# Patient Record
Sex: MALE | Race: WHITE | NOT HISPANIC OR LATINO | Employment: UNEMPLOYED | URBAN - NONMETROPOLITAN AREA
[De-identification: names, ages, dates, MRNs, and addresses within clinical notes are randomized per-mention and may not be internally consistent; named-entity substitution may affect disease eponyms.]

---

## 2017-03-25 ENCOUNTER — OFFICE VISIT (OUTPATIENT)
Dept: RETAIL CLINIC | Facility: CLINIC | Age: 27
End: 2017-03-25

## 2017-03-25 VITALS
HEIGHT: 70 IN | RESPIRATION RATE: 18 BRPM | WEIGHT: 213 LBS | DIASTOLIC BLOOD PRESSURE: 78 MMHG | TEMPERATURE: 97.5 F | HEART RATE: 94 BPM | BODY MASS INDEX: 30.49 KG/M2 | SYSTOLIC BLOOD PRESSURE: 122 MMHG | OXYGEN SATURATION: 98 %

## 2017-03-25 DIAGNOSIS — J02.9 PHARYNGITIS, UNSPECIFIED ETIOLOGY: Primary | ICD-10-CM

## 2017-03-25 PROCEDURE — 99213 OFFICE O/P EST LOW 20 MIN: CPT | Performed by: NURSE PRACTITIONER

## 2017-03-25 RX ORDER — AZITHROMYCIN 250 MG/1
TABLET, FILM COATED ORAL
Qty: 6 TABLET | Refills: 0 | Status: SHIPPED | OUTPATIENT
Start: 2017-03-25

## 2017-03-25 RX ORDER — LORAZEPAM 2 MG/1
TABLET ORAL 2 TIMES DAILY
COMMUNITY
Start: 2016-12-01

## 2017-03-25 NOTE — PATIENT INSTRUCTIONS

## 2017-03-25 NOTE — PROGRESS NOTES
"  Chief Complaint   Patient presents with   • Sore Throat     Subjective   Yoni Pulido is a 26 y.o. male who presents to the clinic today with complaints soret throat, mild occasional cough, fatigue and some sinus drainage. He is here today with his wife who has been febrile and sick for over a week. He has not taken anything for his symptoms.        The following portions of the patient's history were reviewed and updated as appropriate: allergies, past family history, past medical history, past social history, past surgical history and problem list.      Current Outpatient Prescriptions:   •  LORazepam (ATIVAN) 2 MG tablet, Take  by mouth 2 (Two) Times a Day., Disp: , Rfl:   •  azithromycin (ZITHROMAX Z-ADE) 250 MG tablet, Take 2 tablets the first day, then 1 tablet daily for 4 days., Disp: 6 tablet, Rfl: 0  Allergies:  Amoxicillin-pot clavulanate  Past Medical History:   Diagnosis Date   • ADHD (attention deficit hyperactivity disorder)    • Anxiety    • Bipolar disorder      History reviewed. No pertinent surgical history.  History reviewed. No pertinent family history.  Social History   Substance Use Topics   • Smoking status: Never Smoker   • Smokeless tobacco: Never Used   • Alcohol use No       Review of Systems  Review of Systems   Constitutional: Positive for fatigue.   HENT: Positive for postnasal drip and sore throat.    Eyes: Negative.    Respiratory: Positive for cough (mild ).    Cardiovascular: Negative.    Gastrointestinal: Negative.    Endocrine: Negative.    Genitourinary: Negative.    Musculoskeletal: Negative.    Skin: Negative.    Allergic/Immunologic: Negative.    Neurological: Negative.    Hematological: Negative.    Psychiatric/Behavioral: Negative.        Objective   /78 (BP Location: Left arm, Patient Position: Sitting, Cuff Size: Large Adult)  Pulse 94  Temp 97.5 °F (36.4 °C) (Temporal Artery )   Resp 18  Ht 70\" (177.8 cm)  Wt 213 lb (96.6 kg)  SpO2 98%  BMI 30.56 " kg/m2  Last 2 weights    03/25/17  1024   Weight: 213 lb (96.6 kg)       Physical Exam   Constitutional: He is oriented to person, place, and time. He appears well-developed and well-nourished.   HENT:   Head: Normocephalic and atraumatic.   Right Ear: Hearing, tympanic membrane, external ear and ear canal normal.   Left Ear: Hearing, tympanic membrane, external ear and ear canal normal.   Nose: Rhinorrhea present. Right sinus exhibits no maxillary sinus tenderness and no frontal sinus tenderness. Left sinus exhibits no maxillary sinus tenderness and no frontal sinus tenderness.   Mouth/Throat: Uvula is midline and mucous membranes are normal. Posterior oropharyngeal erythema present. Tonsils are 2+ on the right. Tonsils are 2+ on the left. No tonsillar exudate.   Eyes: Pupils are equal, round, and reactive to light.   Neck: Normal range of motion. Neck supple.   Cardiovascular: Normal rate, regular rhythm and normal heart sounds.  Exam reveals no gallop and no friction rub.    No murmur heard.  Pulmonary/Chest: No stridor.   Musculoskeletal: Normal range of motion.   Lymphadenopathy:     He has no cervical adenopathy.   Neurological: He is alert and oriented to person, place, and time.   Psychiatric: He has a normal mood and affect. His behavior is normal.   Vitals reviewed.      Assessment/Plan     Yoni was seen today for sore throat.    Diagnoses and all orders for this visit:    Pharyngitis, unspecified etiology    Other orders  -     azithromycin (ZITHROMAX Z-ADE) 250 MG tablet; Take 2 tablets the first day, then 1 tablet daily for 4 days.    Follow up if symptoms worsen or persist.

## 2017-11-01 ENCOUNTER — OFFICE VISIT (OUTPATIENT)
Dept: PSYCHIATRY | Age: 27
End: 2017-11-01
Payer: MEDICARE

## 2017-11-01 ENCOUNTER — TELEPHONE (OUTPATIENT)
Dept: PSYCHIATRY | Age: 27
End: 2017-11-01

## 2017-11-01 VITALS
WEIGHT: 222.2 LBS | DIASTOLIC BLOOD PRESSURE: 77 MMHG | HEART RATE: 69 BPM | HEIGHT: 70 IN | OXYGEN SATURATION: 97 % | SYSTOLIC BLOOD PRESSURE: 116 MMHG | BODY MASS INDEX: 31.81 KG/M2

## 2017-11-01 DIAGNOSIS — F40.01 PANIC DISORDER WITH AGORAPHOBIA AND SEVERE PANIC ATTACKS: ICD-10-CM

## 2017-11-01 PROCEDURE — 99999 PR OFFICE/OUTPT VISIT,PROCEDURE ONLY: CPT | Performed by: NURSE PRACTITIONER

## 2017-11-01 PROCEDURE — 90832 PSYTX W PT 30 MINUTES: CPT | Performed by: NURSE PRACTITIONER

## 2017-11-01 RX ORDER — CYPROHEPTADINE HYDROCHLORIDE 4 MG/1
TABLET ORAL
Qty: 30 TABLET | Refills: 1 | Status: SHIPPED | OUTPATIENT
Start: 2017-11-01 | End: 2019-01-09

## 2017-11-01 RX ORDER — TRAZODONE HYDROCHLORIDE 50 MG/1
TABLET ORAL
Qty: 60 TABLET | Refills: 1 | Status: SHIPPED | OUTPATIENT
Start: 2017-11-01 | End: 2019-01-09

## 2017-11-01 RX ORDER — LORAZEPAM 0.5 MG/1
TABLET ORAL
Qty: 30 TABLET | Refills: 0 | Status: SHIPPED | OUTPATIENT
Start: 2017-11-01 | End: 2019-01-09

## 2017-11-01 NOTE — PROGRESS NOTES
11/1/2017 10:52 AM   Progress Note        Morris Mccall 1990  Psychotherapy Time Spent: 28 min      Psychotherapy Topics: medications, medication adherence, therapy    Chief Complaint   Patient presents with    Follow-up     Subjective: The patient is a 28 y.o.   male who is here for a routine office visit. Last seen by  LLUVIA Felix 12/1/16. Dx: Mood D/O with psychosis, Panic D/O with agoraphobia and severe panic attacks. With mom today. She reports pt is not able to come to appts without someone with him (which would be his mom, dad or wife) because of anxiety.  and is going well. Lives with her alone and very near his parents. No kids. Depression: \"I've been doing better. \" Mom agrees. Less irritability per pt. Anxiety: Only takes Ativan when needed (when goes out and sometimes for sleep). He has not had his Ativan refilled for a few months. \"Social situations give me anxiety. Leaving the house and even thinking about leaving the house. Like yesterday I was nervous about coming here today. \" Mom states this started in high school but she didn't realize what was going on. He dropped out because of this and was home schooled. Tried to go to college and dropped out twice. Pt reports he has had decreased panic attacks since last appt. Using coping skills he has learned over time. He went to counseling four to five years ago at [facility] and learned some there. Coping better per mom. Pt is not interested in getting out by himself. \"Maybe I'm just used to not doing it. \" We discussed possible connection between benzos and Alzheimer's. He is interested in dcing this med in the future. Mom reports his grandmother had a lot of problems with anxiety and also had difficulty getting out. Dad has hx of depression, grandmother too. Sleep: Takes Ativan if needed for sleep if has a bad day (too anxious). Takes Ativan sparingly. Every night his mind races.  His mind jumps from one thing to another during the day too. \"I can't think about nothing. \" His mom states his mind has difficulty shutting down. Has dreams and night parrish both- night parrish every other night \"for as long as I can remember. I go to bed and wonder what I will dream about that night. \" Interrupts his sleep. Took Trazodone in the past but stopped. He wanted to be able to fall asleep naturally but we discussed this has not been the case for him. He, of course, can continue his current pattern or possibly consider a medication. Appetite: Fine  Substance Use: Denies  Pain: Denies  AH- mumbling- happens every couple of weeks, usually before a panic attack and is happening much less than before. VH- one incident of someone walking down the road. He feels it was related to a build up to a panic attack. Pt does not want to socialize and per mom he has even as a child. Pt goal for this appt. \"I really need that Ativan to sleep and get out of the house. \"  Pt was prescribed Risperdal 0.25 mg bid at last appt and he did take it \"for a while\" but did not notice a difference. He is not sure how long he took it. We discussed trials for meds and adherence to give meds the chance for full benefit. He agrees. He would like to try Trazodone and Periactin. We also discussed starting meds slowly (because of his reticence about meds and past nonadherence). He is ok with this. He understands he can take Trazodone prn but has to take Periactin regularly. He will call with questions or problems. Patient reports side effects as follows: none. No evidence of EPS, no cogwheeling or abnormal motor movements. Absent  suicidal ideation. Reports compliance with medications as see above. Review of Systems - Denies change    Current Meds:    Prior to Admission medications    Medication Sig Start Date End Date Taking?  Authorizing Provider   LORazepam (ATIVAN) 2 MG tablet Take 1 tablet by mouth 2 times daily as needed for Anxiety (Take only as needed for severe anxiety) 12/1/16 12/31/16  Terrie Juan Diego, NP   risperiDONE (RISPERDAL) 0.25 MG tablet Take 1 tablet by mouth 2 times daily 12/1/16   Terrie Juan Diego, NP       MSE:  Patient is  A & O x3. Appearance:  well-appearing appropriately dressed for season and age. Cognition:  Recent memory intact , remote memory intact , good fund of knowledge, average  intelligence level. Speech:  normal  Language: Naming: intact; Word Finding: intact  Conversation no evidence of delusions  Behavior:  Cooperative and Good eye contact (eye contact improved over time from none to almost constant)  Mood: euthymic  Affect: congruent with mood  Thought Content: no evidence of overt psychosis, delusional thought or suicidal /homicidal ideation or plan  Thought Process: linear, goal directed and coherent  Judgement Insight:  normal and appropriate  Gait and Station:normal gait and station   Musculoskeletal: WNL    Assesment: See above   Hansel Jean report reviewed per  req. Plan: Start Trazodone  mg take one to two tablets qhs prn insomnia, Periactin 4 mg take one tablet qhs    1. The risks, benefits, side effects, indications, contraindications, and adverse effects of the medications have been discussed. yes   2. The pt has verbalized understanding and has capacity to give informed consent. 3. The Hansel Jean report has been reviewed according to [facility] regulations. 4. Supportive therapy offered. 5. Follow up: Return in one month  6. The patient has been advised to call with any problems. 7. Controlled substance Treatment Plan: Maintenance for now.    8. The above listed medications have been continued, modifications in meds and other orders/labs as follows:

## 2019-01-09 ENCOUNTER — OFFICE VISIT (OUTPATIENT)
Dept: PSYCHIATRY | Age: 29
End: 2019-01-09
Payer: MEDICARE

## 2019-01-09 VITALS
OXYGEN SATURATION: 98 % | HEART RATE: 93 BPM | SYSTOLIC BLOOD PRESSURE: 131 MMHG | DIASTOLIC BLOOD PRESSURE: 73 MMHG | BODY MASS INDEX: 21.48 KG/M2 | WEIGHT: 145 LBS | HEIGHT: 69 IN

## 2019-01-09 DIAGNOSIS — E43 SEVERE PROTEIN-CALORIE MALNUTRITION (HCC): ICD-10-CM

## 2019-01-09 DIAGNOSIS — Z91.14 NONCOMPLIANCE WITH MEDICATIONS: ICD-10-CM

## 2019-01-09 DIAGNOSIS — R79.89 LOW VITAMIN D LEVEL: ICD-10-CM

## 2019-01-09 DIAGNOSIS — Z79.899 HIGH RISK MEDICATION USE: ICD-10-CM

## 2019-01-09 DIAGNOSIS — R63.4 WEIGHT LOSS: ICD-10-CM

## 2019-01-09 DIAGNOSIS — F40.01 AGORAPHOBIA WITH PANIC ATTACKS: Primary | ICD-10-CM

## 2019-01-09 PROBLEM — Z91.148 NONCOMPLIANCE WITH MEDICATIONS: Status: ACTIVE | Noted: 2019-01-09

## 2019-01-09 PROCEDURE — 99215 OFFICE O/P EST HI 40 MIN: CPT | Performed by: NURSE PRACTITIONER

## 2019-01-22 DIAGNOSIS — R79.89 LOW VITAMIN D LEVEL: ICD-10-CM

## 2019-01-22 DIAGNOSIS — E43 SEVERE PROTEIN-CALORIE MALNUTRITION (HCC): ICD-10-CM

## 2019-01-22 DIAGNOSIS — Z79.899 HIGH RISK MEDICATION USE: ICD-10-CM

## 2019-01-22 DIAGNOSIS — R63.4 WEIGHT LOSS: ICD-10-CM

## 2019-01-22 DIAGNOSIS — F40.01 AGORAPHOBIA WITH PANIC ATTACKS: ICD-10-CM

## 2019-01-22 LAB
ALBUMIN SERPL-MCNC: 4.7 G/DL (ref 3.5–5.2)
ALP BLD-CCNC: 99 U/L (ref 40–130)
ALT SERPL-CCNC: 34 U/L (ref 5–41)
ANION GAP SERPL CALCULATED.3IONS-SCNC: 13 MMOL/L (ref 7–19)
AST SERPL-CCNC: 18 U/L (ref 5–40)
BILIRUB SERPL-MCNC: 0.7 MG/DL (ref 0.2–1.2)
BUN BLDV-MCNC: 15 MG/DL (ref 6–20)
CALCIUM SERPL-MCNC: 10.2 MG/DL (ref 8.6–10)
CHLORIDE BLD-SCNC: 104 MMOL/L (ref 98–111)
CO2: 28 MMOL/L (ref 22–29)
CREAT SERPL-MCNC: 0.6 MG/DL (ref 0.5–1.2)
GFR NON-AFRICAN AMERICAN: >60
GLUCOSE FASTING: 83 MG/DL (ref 74–109)
HBA1C MFR BLD: 5.1 % (ref 4–6)
HCT VFR BLD CALC: 46.5 % (ref 42–52)
HEMOGLOBIN: 14.7 G/DL (ref 14–18)
MCH RBC QN AUTO: 29.8 PG (ref 27–31)
MCHC RBC AUTO-ENTMCNC: 31.6 G/DL (ref 33–37)
MCV RBC AUTO: 94.3 FL (ref 80–94)
PDW BLD-RTO: 13 % (ref 11.5–14.5)
PLATELET # BLD: 293 K/UL (ref 130–400)
PMV BLD AUTO: 10.1 FL (ref 9.4–12.4)
POTASSIUM SERPL-SCNC: 4.3 MMOL/L (ref 3.5–5)
RBC # BLD: 4.93 M/UL (ref 4.7–6.1)
SODIUM BLD-SCNC: 145 MMOL/L (ref 136–145)
TOTAL PROTEIN: 7.6 G/DL (ref 6.6–8.7)
VITAMIN B-12: 853 PG/ML (ref 211–946)
WBC # BLD: 5.4 K/UL (ref 4.8–10.8)

## 2019-01-26 LAB
VITAMIN D2 AND D3, TOTAL: 28.3 NG/ML (ref 30–80)
VITAMIN D2, 25 HYDROXY: <1 NG/ML
VITAMIN D3,25 HYDROXY: 28.3 NG/ML

## 2019-08-12 ENCOUNTER — APPOINTMENT (OUTPATIENT)
Dept: LAB | Facility: HOSPITAL | Age: 29
End: 2019-08-12

## 2019-08-12 ENCOUNTER — TRANSCRIBE ORDERS (OUTPATIENT)
Dept: ADMINISTRATIVE | Facility: HOSPITAL | Age: 29
End: 2019-08-12

## 2019-08-12 DIAGNOSIS — N46.9 INFERTILITY MALE: Primary | ICD-10-CM

## 2019-08-12 LAB
CHARACTER SMN: NORMAL
COLLECTION METHOD SMN: ABNORMAL
EPI CELLS #/AREA SMN HPF: ABNORMAL /HPF
LIQUEFACTION TIME SMN: NORMAL MIN
NON PROGRESSIVE MOTILITY: 10.1 %
RBC #/AREA SMN HPF: ABNORMAL /HPF
SEX ABSTIN DURATION TIME PATIENT: 7 DAYS
SLOW PROGRESSIVE MOTILITY: 16.4 %
SMI: 102 (ref 80–400)
SPEC CONTAINER SPEC: ABNORMAL
SPECIMEN VOL SMN: 4 ML
SPERM # SMN: 44.6 MILLIONS/ML
SPERM IMMOTILE NFR SMN: 53.8 %
SPERM MOTILE NFR SMN: 46.2 % MOTILE (ref 50–100)
SPERM PROG NFR SMN: 19.7 % (ref 25–100)
SPERM SMN: 7.4 % NORMAL (ref 15–100)
VISC SMN: NORMAL CP
WBC SMN QL: ABNORMAL /HPF
WHO STANDARD: ABNORMAL

## 2019-08-12 PROCEDURE — 89320 SEMEN ANAL VOL/COUNT/MOT: CPT | Performed by: OBSTETRICS & GYNECOLOGY

## 2020-07-15 ENCOUNTER — OFFICE VISIT (OUTPATIENT)
Dept: INTERNAL MEDICINE | Age: 30
End: 2020-07-15
Payer: MEDICARE

## 2020-07-15 VITALS
SYSTOLIC BLOOD PRESSURE: 122 MMHG | BODY MASS INDEX: 20.32 KG/M2 | DIASTOLIC BLOOD PRESSURE: 70 MMHG | OXYGEN SATURATION: 99 % | HEIGHT: 69 IN | HEART RATE: 66 BPM | WEIGHT: 137.2 LBS

## 2020-07-15 DIAGNOSIS — E55.9 HYPOVITAMINOSIS D: ICD-10-CM

## 2020-07-15 DIAGNOSIS — Z11.4 SCREENING FOR HIV WITHOUT PRESENCE OF RISK FACTORS: ICD-10-CM

## 2020-07-15 DIAGNOSIS — Z00.00 ROUTINE PHYSICAL EXAMINATION: ICD-10-CM

## 2020-07-15 PROBLEM — Z91.14 NONCOMPLIANCE WITH MEDICATIONS: Status: RESOLVED | Noted: 2019-01-09 | Resolved: 2020-07-15

## 2020-07-15 PROBLEM — Z91.148 NONCOMPLIANCE WITH MEDICATIONS: Status: RESOLVED | Noted: 2019-01-09 | Resolved: 2020-07-15

## 2020-07-15 LAB
ALBUMIN SERPL-MCNC: 5.2 G/DL (ref 3.5–5.2)
ALP BLD-CCNC: 93 U/L (ref 40–130)
ALT SERPL-CCNC: 30 U/L (ref 5–41)
ANION GAP SERPL CALCULATED.3IONS-SCNC: 14 MMOL/L (ref 7–19)
AST SERPL-CCNC: 19 U/L (ref 5–40)
BASOPHILS ABSOLUTE: 0 K/UL (ref 0–0.2)
BASOPHILS RELATIVE PERCENT: 0.7 % (ref 0–1)
BILIRUB SERPL-MCNC: 1.1 MG/DL (ref 0.2–1.2)
BUN BLDV-MCNC: 13 MG/DL (ref 6–20)
CALCIUM SERPL-MCNC: 10.4 MG/DL (ref 8.6–10)
CHLORIDE BLD-SCNC: 103 MMOL/L (ref 98–111)
CHOLESTEROL, TOTAL: 112 MG/DL (ref 160–199)
CO2: 27 MMOL/L (ref 22–29)
CREAT SERPL-MCNC: 0.7 MG/DL (ref 0.5–1.2)
EOSINOPHILS ABSOLUTE: 0.1 K/UL (ref 0–0.6)
EOSINOPHILS RELATIVE PERCENT: 2.4 % (ref 0–5)
GFR AFRICAN AMERICAN: >59
GFR NON-AFRICAN AMERICAN: >60
GLUCOSE BLD-MCNC: 90 MG/DL (ref 74–109)
HCT VFR BLD CALC: 46.6 % (ref 42–52)
HDLC SERPL-MCNC: 59 MG/DL (ref 55–121)
HEMOGLOBIN: 15.2 G/DL (ref 14–18)
HIV-1 P24 AG: NORMAL
IMMATURE GRANULOCYTES #: 0 K/UL
LDL CHOLESTEROL CALCULATED: 45 MG/DL
LYMPHOCYTES ABSOLUTE: 1.6 K/UL (ref 1.1–4.5)
LYMPHOCYTES RELATIVE PERCENT: 39.5 % (ref 20–40)
MCH RBC QN AUTO: 30.8 PG (ref 27–31)
MCHC RBC AUTO-ENTMCNC: 32.6 G/DL (ref 33–37)
MCV RBC AUTO: 94.3 FL (ref 80–94)
MONOCYTES ABSOLUTE: 0.3 K/UL (ref 0–0.9)
MONOCYTES RELATIVE PERCENT: 7.6 % (ref 0–10)
NEUTROPHILS ABSOLUTE: 2 K/UL (ref 1.5–7.5)
NEUTROPHILS RELATIVE PERCENT: 49.8 % (ref 50–65)
PDW BLD-RTO: 12.7 % (ref 11.5–14.5)
PLATELET # BLD: 251 K/UL (ref 130–400)
PMV BLD AUTO: 9.9 FL (ref 9.4–12.4)
POTASSIUM SERPL-SCNC: 4.4 MMOL/L (ref 3.5–5)
RAPID HIV 1&2: NORMAL
RBC # BLD: 4.94 M/UL (ref 4.7–6.1)
SODIUM BLD-SCNC: 144 MMOL/L (ref 136–145)
TOTAL PROTEIN: 7.7 G/DL (ref 6.6–8.7)
TRIGL SERPL-MCNC: 38 MG/DL (ref 0–149)
TSH SERPL DL<=0.05 MIU/L-ACNC: 1.34 UIU/ML (ref 0.27–4.2)
VITAMIN D 25-HYDROXY: 35 NG/ML
WBC # BLD: 4.1 K/UL (ref 4.8–10.8)

## 2020-07-15 PROCEDURE — 1036F TOBACCO NON-USER: CPT | Performed by: INTERNAL MEDICINE

## 2020-07-15 PROCEDURE — G8427 DOCREV CUR MEDS BY ELIG CLIN: HCPCS | Performed by: INTERNAL MEDICINE

## 2020-07-15 PROCEDURE — 99203 OFFICE O/P NEW LOW 30 MIN: CPT | Performed by: INTERNAL MEDICINE

## 2020-07-15 PROCEDURE — 86580 TB INTRADERMAL TEST: CPT | Performed by: INTERNAL MEDICINE

## 2020-07-15 PROCEDURE — G8420 CALC BMI NORM PARAMETERS: HCPCS | Performed by: INTERNAL MEDICINE

## 2020-07-15 ASSESSMENT — ENCOUNTER SYMPTOMS
TROUBLE SWALLOWING: 0
BLOOD IN STOOL: 0
STRIDOR: 0
BACK PAIN: 0
COLOR CHANGE: 0
ABDOMINAL PAIN: 0
WHEEZING: 0
CONSTIPATION: 0
COUGH: 0
SHORTNESS OF BREATH: 0
SORE THROAT: 0
DIARRHEA: 0

## 2020-07-15 ASSESSMENT — PATIENT HEALTH QUESTIONNAIRE - PHQ9
SUM OF ALL RESPONSES TO PHQ9 QUESTIONS 1 & 2: 0
2. FEELING DOWN, DEPRESSED OR HOPELESS: 0
SUM OF ALL RESPONSES TO PHQ QUESTIONS 1-9: 0
SUM OF ALL RESPONSES TO PHQ QUESTIONS 1-9: 0
1. LITTLE INTEREST OR PLEASURE IN DOING THINGS: 0

## 2020-07-15 NOTE — PATIENT INSTRUCTIONS
Patient Education        Learning About the Mediterranean Diet  What is the [address]? The Mediterranean diet is a style of eating rather than a diet plan. It features foods eaten in [place], [place], [place] and [place], and other countries along the [facility]. It emphasizes eating foods like fish, fruits, vegetables, beans, high-fiber breads and whole grains, nuts, and olive oil. This style of eating includes limited red meat, cheese, and sweets. Why choose the Mediterranean diet? A Mediterranean-style diet may improve heart health. It contains more fat than other heart-healthy diets. But the fats are mainly from nuts, unsaturated oils (such as fish oils and olive oil), and certain nut or seed oils (such as canola, soybean, or flaxseed oil). These fats may help protect the heart and blood vessels. How can you get started on the Mediterranean diet? Here are some things you can do to switch to a more Mediterranean way of eating. What to eat  · Eat a variety of fruits and vegetables each day, such as grapes, blueberries, tomatoes, broccoli, peppers, figs, olives, spinach, eggplant, beans, lentils, and chickpeas. · Eat a variety of whole-grain foods each day, such as oats, brown rice, and whole wheat bread, pasta, and couscous. · Eat fish at least 2 times a week. Try tuna, salmon, mackerel, lake trout, herring, or sardines. · Eat moderate amounts of low-fat dairy products, such as milk, cheese, or yogurt. · Eat moderate amounts of poultry and eggs. · Choose healthy (unsaturated) fats, such as nuts, olive oil, and certain nut or seed oils like canola, soybean, and flaxseed. · Limit unhealthy (saturated) fats, such as butter, palm oil, and coconut oil. And limit fats found in animal products, such as meat and dairy products made with whole milk. Try to eat red meat only a few times a month in very small amounts. · Limit sweets and desserts to only a few times a week.  This includes sugar-sweetened drinks like soda. The Mediterranean diet may also include red wine with your meal--1 glass each day for women and up to 2 glasses a day for men. Tips for eating at home  · Use herbs, spices, garlic, lemon zest, and citrus juice instead of salt to add flavor to foods. · Add avocado slices to your sandwich instead of garcia. · Have fish for lunch or dinner instead of red meat. Brush the fish with olive oil, and broil or grill it. · Sprinkle your salad with seeds or nuts instead of cheese. · Cook with olive or canola oil instead of butter or oils that are high in saturated fat. · Switch from 2% milk or whole milk to 1% or fat-free milk. · Dip raw vegetables in a vinaigrette dressing or hummus instead of dips made from mayonnaise or sour cream.  · Have a piece of fruit for dessert instead of a piece of cake. Try baked apples, or have some dried fruit. Tips for eating out  · Try broiled, grilled, baked, or poached fish instead of having it fried or breaded. · Ask your  to have your meals prepared with olive oil instead of butter. · Order dishes made with marinara sauce or sauces made from olive oil. Avoid sauces made from cream or mayonnaise. · Choose whole-grain breads, whole wheat pasta and pizza crust, brown rice, beans, and lentils. · Cut back on butter or margarine on bread. Instead, you can dip your bread in a small amount of olive oil. · Ask for a side salad or grilled vegetables instead of french fries or chips. Where can you learn more? Go to https://[employer]aleweb.wireWAX. org and sign in to your [employer] account. Enter [phone] in the [facility] box to learn more about \"Learning About the Mediterranean Diet. \"     If you do not have an account, please click on the \"Sign Up Now\" link. Current as of: August 22, 2019               Content Version: 12.5  © 0317-0575 [employer]. Care instructions adapted under license by [facility] [address]  If you have questions about a medical condition or this instruction, always ask your healthcare professional. [address]  any warranty or liability for your use of this information.          Patient advised moderate aerobic activity 150 minutes per week  Sun avoidance between 10 - 2, reapply sun block every 20 minutes  Consume a heart healthy diet, mediterranean diet   Avoidance of Tobacco Use  Alcohol consumption, limit to one drink per day

## 2020-07-15 NOTE — PROGRESS NOTES
[address], Magda Jiang  [address] [address]  Phone: [phone]  Fax: [phone]    Visit Date:  7/15/2020    SUBJECTIVE:     Delilah Pearson is a 26 y.o. male presents today in the office for:    Chief Complaint   Patient presents with   [address]     needs form filled out for foster care       HPI  79-year-old male who presents as a new patient visit  Previously seen by psychiatry for mood disorder with psychosis however patient states that he has been managing well on his own and does not need any more psych meds  Today presents as a new patient offers no complaints he exercises regularly eats well offers no complaints of chest pain shortness of breath weight loss fever cough no GI symptoms routine physical he is not a smoker his weight is good he denies any blood pressure elevations not taking any supplements no high risk sexual or IV drugs  Past Medical History:   Diagnosis Date    Anxiety     Depression     Hallucinations     Mood swings     Nightmares     Social phobia        No past surgical history on file.     Social History     Socioeconomic History    Marital status:      Spouse name: Not on file    Number of children: Not on file    Years of education: Not on file    Highest education level: Not on file   Occupational History    Not on file   Social Needs    Financial resource strain: Not on file    Food insecurity     Worry: Not on file     Inability: Not on file    Transportation needs     Medical: Not on file     Non-medical: Not on file   Tobacco Use    Smoking status: Never Smoker    Smokeless tobacco: Never Used   Substance and Sexual Activity    Alcohol use: No    Drug use: No    Sexual activity: Yes     Partners: Female   Lifestyle    Physical activity     Days per week: Not on file     Minutes per session: Not on file    Stress: Not on file   Relationships    Social connections     Talks on phone: Not on file     Gets together: Not on file     Attends Jewish service: Not on file     Active member of club or organization: Not on file     Attends meetings of clubs or organizations: Not on file     Relationship status: Not on file    Intimate partner violence     Fear of current or ex partner: Not on file     Emotionally abused: Not on file     Physically abused: Not on file     Forced sexual activity: Not on file   Other Topics Concern    Not on file   Social History Narrative    Patient lives with his wife and dog. His parents live nearby and he visits them frequently. He has been unable to work since early 2012 due to worsening of panic symptoms. No drug or alcohol history. No history of abuse or trauma. No legal history. Did well in school until had to be home schooled due to anxiety problems in high school. No problems with learning. Has applied for disability. Started in high school, dropped out because of anxiety and was home schooled. Tried to go to college and dropped out twice. He went to counseling four to five years ago at [facility], [employer]. his grandmother had a lot of problems with anxiety and also had difficulty getting out. Dad has hx of depression, grandmother too. Has taken: Seroquel, Depakote, Abilify, Ativan, Paxil, Trazodone, Periactin, Cogentin. Cancelled appointment December 2017. \"I have never liked to write or read. \"        Was seen in the ER for a few hours and was having a panic attack in 2014 or 2015. Was under the care of Danette Botello NP. Previous Med Trials:    1. Zoloft - stated he felt \"horrible\" and \"didn't get excited or upset about anything\"    2. Effexor - same as zoloft    3. Abilify - benefit unknown    4. Clonazepam - reported he still had panic attacks, \"but delayed\"    5. Depakote - perceived no benefit    6. Risperdal - stopped taking    7. Paxil - same as zoloft    8. Seroquel - too sedating    9.  Trazodone - perceived benefit         From ages 7 to 11 completed and competed in [employer]. He attained a . He did well in school. All this started in 10 th 11 th grade. Family History   Problem Relation Age of Onset   Osvaldo Benitez Depression Father         was treated     Hypertension Father     Diabetes Father     Depression Paternal Grandmother         committed suicide in 2015    Anxiety Disorder Maternal Cousin     Other Maternal Grandmother         pneumonia    Colon Cancer Maternal Grandfather     Cancer Paternal Grandfather         lung    Depression Paternal Cousin         overdosed on pain medication    Anxiety Disorder Paternal Cousin        Allergies   Allergen Reactions    Amoxicillin-Pot Clavulanate        Current Outpatient Medications   Medication Sig Dispense Refill    tuberculin 5 UNIT/0.1ML injection Inject 0.1 mLs into the skin once for 1 dose 0.1 mL 0     No current facility-administered medications for this visit. Patient Active Problem List   Diagnosis    Panic disorder with agoraphobia and severe panic attacks    Mood disorder with psychosis ([facility] [address]    Noncompliance with medications             Review of Systems:   Review of Systems   Constitutional: Negative for activity change, appetite change, chills, diaphoresis, fatigue and fever. HENT: Negative for congestion, hearing loss, postnasal drip, sore throat, tinnitus and trouble swallowing. Eyes: Negative for visual disturbance. Respiratory: Negative for cough, shortness of breath, wheezing and stridor. Cardiovascular: Negative for chest pain, palpitations and leg swelling. Gastrointestinal: Negative for abdominal pain, blood in stool, constipation and diarrhea. Endocrine: Negative for cold intolerance. Genitourinary: Negative for frequency. Musculoskeletal: Negative for arthralgias, back pain and joint swelling. Skin: Negative for color change and wound.    Allergic/Immunologic: Negative for environmental allergies. Neurological: Negative for dizziness, light-headedness and headaches. Hematological: Negative for adenopathy. Does not bruise/bleed easily. Psychiatric/Behavioral: Negative for decreased concentration, dysphoric mood and sleep disturbance. The patient is not nervous/anxious. OBJECTIVE:     Physical Exam:    Physical Exam  Vitals signs and nursing note reviewed. Constitutional:       General: He is not in acute distress. Appearance: He is well-developed. HENT:      Head: Normocephalic and atraumatic. Right Ear: External ear normal.      Left Ear: External ear normal.      Nose: Nose normal.   Eyes:      General: No scleral icterus. Conjunctiva/sclera: Conjunctivae normal.      Pupils: Pupils are equal, round, and reactive to light. Neck:      Musculoskeletal: Normal range of motion and neck supple. Thyroid: No thyromegaly. Cardiovascular:      Rate and Rhythm: Normal rate and regular rhythm. Heart sounds: Normal heart sounds. No murmur. Pulmonary:      Effort: Pulmonary effort is normal.      Breath sounds: Normal breath sounds. No wheezing or rales. Abdominal:      General: Bowel sounds are normal. There is no distension. Palpations: Abdomen is soft. There is no mass. Tenderness: There is no abdominal tenderness. There is no rebound. Musculoskeletal: Normal range of motion. General: No tenderness. Lymphadenopathy:      Cervical: No cervical adenopathy. Skin:     General: Skin is warm and dry. Findings: No rash. Neurological:      Mental Status: He is alert and oriented to person, place, and time. Cranial Nerves: No cranial nerve deficit. Deep Tendon Reflexes: Reflexes normal.   Psychiatric:         Behavior: Behavior normal.         Thought Content:  Thought content normal.         Judgment: Judgment normal.       Orders Only on 01/22/2019   Component Date Value Ref Range Status    Sodium 01/22/2019 145 136 - 145 mmol/L Final    Potassium 01/22/2019 4.3  3.5 - 5.0 mmol/L Final    Chloride 01/22/2019 104  98 - 111 mmol/L Final    CO2 01/22/2019 28  22 - 29 mmol/L Final    Anion Gap 01/22/2019 13  7 - 19 mmol/L Final    Glucose, Fasting 01/22/2019 83  74 - 109 mg/dL Final    BUN 01/22/2019 15  6 - 20 mg/dL Final    CREATININE 01/22/2019 0.6  0.5 - 1.2 mg/dL Final    GFR Non- 01/22/2019 >60  >60 Final    Comment: This calculation may be inaccurate for patients under the age of 25 years. For ages 25 and older, a GFR >60 mL/min/1.73m2 (not corrected for weight) is  valid for stable renal function.  Calcium 01/22/2019 10.2* 8.6 - 10.0 mg/dL Final    Total Protein 01/22/2019 7.6  6.6 - 8.7 g/dL Final    Alb 01/22/2019 4.7  3.5 - 5.2 g/dL Final    Total Bilirubin 01/22/2019 0.7  0.2 - 1.2 mg/dL Final    Alkaline Phosphatase 01/22/2019 99  40 - 130 U/L Final    ALT 01/22/2019 34  5 - 41 U/L Final    AST 01/22/2019 18  5 - 40 U/L Final    WBC 01/22/2019 5.4  4.8 - 10.8 K/uL Final    RBC 01/22/2019 4.93  4.70 - 6.10 M/uL Final    Hemoglobin 01/22/2019 14.7  14.0 - 18.0 g/dL Final    Hematocrit 01/22/2019 46.5  42.0 - 52.0 % Final    MCV 01/22/2019 94.3* 80.0 - 94.0 fL Final    MCH 01/22/2019 29.8  27.0 - 31.0 pg Final    MCHC 01/22/2019 31.6* 33.0 - 37.0 g/dL Final    RDW 01/22/2019 13.0  11.5 - 14.5 % Final    Platelets 85/09/9238 293  130 - 400 K/uL Final    MPV 01/22/2019 10.1  9.4 - 12.4 fL Final    Hemoglobin A1C 01/22/2019 5.1  4.0 - 6.0 % Final    Comment: HbA1c levels >6% are an indication of hyperglycemia during the preceding 2  to 3 months or longer. HbA1c levels may reach 20% or higher in poorly controlled diabetes. Therapeutic action is suggested at levels above 8%. Diabetes patients with HbA1c levels below 7% meet the goal of the American  Diabetes Association.     HbA1c levels below the established reference range may indicate recent  episodes of hypoglycemia, the presence of Hb variants, or shortened lifetime  of erythrocytes.       Vitamin B-12 01/22/2019 853  211 - 946 pg/mL Final    Vitamin D2 And D3, Total 01/22/2019 28.3* 30.0 - 80.0 ng/mL Final    Comment: INTERPRETIVE INFORMATION: 25-HydroxyVitamin D2 and D3, Serum  1-17 years:  Deficiency: less than 20 ng/mL  Optimum level: greater than or equal to 20 ng/mL*  *(Perez CL et al. Pediatrics 2008; 122: 1142-52.)  18 years and older:  Deficiency:  Less than 20 ng/mL  Insufficiency:  20-29 ng/mL  Optimum Level:  30-80 ng/mL  Possible Toxicity:  Greater than 800 ng/mL  (Rosalva MF et al. [facility] 6759; 70:7032-17)  Separate values for Vitamin D2 and D3 are reported in addition to the  total.  Access complete set of age- and/or gender-specific reference intervals  for  this test in the [employer] [facility] Test Directory ([facility]). Test developed and characteristics determined by [facility]. See  Compliance Statement B: [facility]/CS  U.S. Patent No. [id number]  Performed by [facility],  [facility], [address] [facility] [phone]  www. Kirsten Fragoso MD - Lab. Director      Vitamin D3,25 Hydroxy 01/22/2019 28.3  ng/mL Final    Vitamin D2, 25 Hydroxy 01/22/2019 <1.0  ng/mL Final       ASSESSMENT:      Diagnosis Orders   1. Mood disorder with psychosis ([facility] [address]     2. Panic disorder with agoraphobia and severe panic attacks     3. Routine physical examination  CBC Auto Differential    Comprehensive Metabolic Panel    Lipid Panel    HIV Rapid 1&2    TSH without Reflex    Vitamin D 25 Hydroxy   4. Tuberculosis screening  tuberculin 5 UNIT/0.1ML injection   5. Hypovitaminosis D  Vitamin D 25 Hydroxy   6.  Screening for HIV without presence of risk factors  HIV Rapid 1&2   Normal physical exam PPD    PLAN:        Medications Ordered:  Orders Placed This Encounter   Medications    tuberculin 5 UNIT/0.1ML injection     Sig: Inject 0.1 mLs into the skin once for 1 dose     Dispense:  0.1 mL Refill:  0       Other Orders Placed:  Orders Placed This Encounter   Procedures    CBC Auto Differential     Standing Status:   Future     Standing Expiration Date:   7/15/2021    Comprehensive Metabolic Panel     Standing Status:   Future     Standing Expiration Date:   7/15/2021    Lipid Panel     Standing Status:   Future     Standing Expiration Date:   7/15/2021     Order Specific Question:   Is Patient Fasting?/# of Hours     Answer:   yes    HIV Rapid 1&2     Standing Status:   Future     Standing Expiration Date:   7/15/2021     Order Specific Question:   Specify Date & Time of Incident     Answer:   0    TSH without Reflex     Standing Status:   Future     Standing Expiration Date:   7/15/2021    Vitamin D 25 Hydroxy     Standing Status:   Future     Standing Expiration Date:   7/15/2021       Return in about 1 year (around 7/15/2021).            Electronically signed by Sudarshan Frausto MD on 7/15/2020 at 1:01 PM

## 2020-07-15 NOTE — PROGRESS NOTES
PPD Placement note  Sayra Selfanai, 26 y.o. male is here today for placement of PPD test  Reason for PPD test: foster care  Pt taken PPD test before: no  Verified in allergy area and with patient that they are not allergic to the products PPD is made of (Phenol or Tween). Yes  Is patient taking any oral or IV steroid medication now or have they taken it in the last month? no  Has the patient ever received the BCG vaccine?: no  Has the patient been in recent contact with anyone known or suspected of having active TB disease?: no       Date of exposure (if applicable): NA       Name of person they were exposed to (if applicable): NA  Patient's Country of origin?: [place]  O: Alert and oriented in NAD. P:  PPD placed on 7/15/2020 Left arm. Patient advised to return for reading within 48-72 hours.

## 2020-07-17 ENCOUNTER — TELEPHONE (OUTPATIENT)
Dept: INTERNAL MEDICINE | Age: 30
End: 2020-07-17

## 2020-07-17 NOTE — TELEPHONE ENCOUNTER
PPD Reading Note  PPD read and results entered in [address]. Result: 0.0 mm induration.   Interpretation: negative  Allergic reaction: no

## 2021-08-03 ENCOUNTER — TELEPHONE (OUTPATIENT)
Dept: INTERNAL MEDICINE | Age: 31
End: 2021-08-03

## 2021-11-04 ENCOUNTER — OFFICE VISIT (OUTPATIENT)
Dept: PRIMARY CARE CLINIC | Age: 31
End: 2021-11-04
Payer: MEDICARE

## 2021-11-04 VITALS
OXYGEN SATURATION: 99 % | WEIGHT: 140.5 LBS | RESPIRATION RATE: 16 BRPM | SYSTOLIC BLOOD PRESSURE: 122 MMHG | HEIGHT: 69 IN | BODY MASS INDEX: 20.81 KG/M2 | TEMPERATURE: 97.1 F | DIASTOLIC BLOOD PRESSURE: 76 MMHG | HEART RATE: 65 BPM

## 2021-11-04 DIAGNOSIS — F32.A ANXIETY AND DEPRESSION: ICD-10-CM

## 2021-11-04 DIAGNOSIS — F41.9 ANXIETY AND DEPRESSION: ICD-10-CM

## 2021-11-04 DIAGNOSIS — Z00.00 WELLNESS EXAMINATION: Primary | ICD-10-CM

## 2021-11-04 DIAGNOSIS — D72.819 LEUKOPENIA, UNSPECIFIED TYPE: ICD-10-CM

## 2021-11-04 DIAGNOSIS — Z23 NEED FOR INFLUENZA VACCINATION: ICD-10-CM

## 2021-11-04 LAB
ALBUMIN SERPL-MCNC: 4.7 G/DL (ref 3.5–5.2)
ALP BLD-CCNC: 94 U/L (ref 40–130)
ALT SERPL-CCNC: 26 U/L (ref 5–41)
ANION GAP SERPL CALCULATED.3IONS-SCNC: 11 MMOL/L (ref 7–19)
AST SERPL-CCNC: 19 U/L (ref 5–40)
BASOPHILS ABSOLUTE: 0 K/UL (ref 0–0.2)
BASOPHILS RELATIVE PERCENT: 0.4 % (ref 0–1)
BILIRUB SERPL-MCNC: 1 MG/DL (ref 0.2–1.2)
BUN BLDV-MCNC: 13 MG/DL (ref 6–20)
CALCIUM SERPL-MCNC: 10 MG/DL (ref 8.6–10)
CHLORIDE BLD-SCNC: 101 MMOL/L (ref 98–111)
CO2: 28 MMOL/L (ref 22–29)
CREAT SERPL-MCNC: 0.7 MG/DL (ref 0.5–1.2)
EOSINOPHILS ABSOLUTE: 0.1 K/UL (ref 0–0.6)
EOSINOPHILS RELATIVE PERCENT: 1.3 % (ref 0–5)
GFR AFRICAN AMERICAN: >59
GFR NON-AFRICAN AMERICAN: >60
GLUCOSE BLD-MCNC: 83 MG/DL (ref 74–109)
HCT VFR BLD CALC: 47.5 % (ref 42–52)
HEMOGLOBIN: 15.1 G/DL (ref 14–18)
IMMATURE GRANULOCYTES #: 0 K/UL
LYMPHOCYTES ABSOLUTE: 1.8 K/UL (ref 1.1–4.5)
LYMPHOCYTES RELATIVE PERCENT: 33.3 % (ref 20–40)
MCH RBC QN AUTO: 29.8 PG (ref 27–31)
MCHC RBC AUTO-ENTMCNC: 31.8 G/DL (ref 33–37)
MCV RBC AUTO: 93.7 FL (ref 80–94)
MONOCYTES ABSOLUTE: 0.3 K/UL (ref 0–0.9)
MONOCYTES RELATIVE PERCENT: 6.2 % (ref 0–10)
NEUTROPHILS ABSOLUTE: 3.2 K/UL (ref 1.5–7.5)
NEUTROPHILS RELATIVE PERCENT: 58.8 % (ref 50–65)
PDW BLD-RTO: 12.5 % (ref 11.5–14.5)
PLATELET # BLD: 268 K/UL (ref 130–400)
PMV BLD AUTO: 10.4 FL (ref 9.4–12.4)
POTASSIUM SERPL-SCNC: 4.3 MMOL/L (ref 3.5–5)
RBC # BLD: 5.07 M/UL (ref 4.7–6.1)
SODIUM BLD-SCNC: 140 MMOL/L (ref 136–145)
TOTAL PROTEIN: 7.4 G/DL (ref 6.6–8.7)
TSH SERPL DL<=0.05 MIU/L-ACNC: 1.44 UIU/ML (ref 0.27–4.2)
WBC # BLD: 5.5 K/UL (ref 4.8–10.8)

## 2021-11-04 PROCEDURE — 90674 CCIIV4 VAC NO PRSV 0.5 ML IM: CPT | Performed by: FAMILY MEDICINE

## 2021-11-04 PROCEDURE — G0008 ADMIN INFLUENZA VIRUS VAC: HCPCS | Performed by: FAMILY MEDICINE

## 2021-11-04 PROCEDURE — 99385 PREV VISIT NEW AGE 18-39: CPT | Performed by: FAMILY MEDICINE

## 2021-11-04 ASSESSMENT — PATIENT HEALTH QUESTIONNAIRE - PHQ9
2. FEELING DOWN, DEPRESSED OR HOPELESS: 1
SUM OF ALL RESPONSES TO PHQ QUESTIONS 1-9: 2
SUM OF ALL RESPONSES TO PHQ QUESTIONS 1-9: 2
SUM OF ALL RESPONSES TO PHQ9 QUESTIONS 1 & 2: 2
1. LITTLE INTEREST OR PLEASURE IN DOING THINGS: 1
SUM OF ALL RESPONSES TO PHQ QUESTIONS 1-9: 2

## 2021-11-04 ASSESSMENT — ENCOUNTER SYMPTOMS
ABDOMINAL PAIN: 0
DIARRHEA: 0
VOMITING: 0
CONSTIPATION: 0
NAUSEA: 0
COUGH: 0
COLOR CHANGE: 0
RHINORRHEA: 0

## 2021-11-04 NOTE — PROGRESS NOTES
After obtaining consent, and per orders of Dr. Hermelinda Brasher, injection of Influenza given in Left deltoid by Deshawn Aragon. Pt tolerated well.

## 2021-11-04 NOTE — PROGRESS NOTES
SUBJECTIVE:    Patient ID: Julissa Wu is a 27 y.o. male. HPI:   Patient is seen today to establish care and for a physical.  He is needing a foster care physical form filled out. He states that overall he is healthy. He does have a history of anxiety and depression and has been seen by [facility] behavioral health previously but is not currently being followed by anyone and is not on any medications. He has been off medications for about 3 years and states that he is managing well. He denies any suicidal thoughts or ideation. He states that overall he feels well. He is fasting today for labs. He states that he would like to receive a flu shot today. Past Medical History:   Diagnosis Date    Anxiety     Depression       No current outpatient medications on file prior to visit. No current facility-administered medications on file prior to visit. Allergies   Allergen Reactions    Amoxicillin-Pot Clavulanate        Review of Systems   Constitutional: Negative for activity change, appetite change and fatigue. HENT: Negative for congestion and rhinorrhea. Eyes: Negative for visual disturbance. Respiratory: Negative for cough. Cardiovascular: Negative for chest pain and palpitations. Gastrointestinal: Negative for abdominal pain, constipation, diarrhea, nausea and vomiting. Genitourinary: Negative for decreased urine volume and difficulty urinating. Musculoskeletal: Negative for arthralgias. Skin: Negative for color change and rash. Allergic/Immunologic: Negative for immunocompromised state. Neurological: Negative for seizures and headaches. Hematological: Does not bruise/bleed easily. Psychiatric/Behavioral: Negative for agitation and sleep disturbance. OBJECTIVE:    Physical Exam  Constitutional:       General: He is not in acute distress. Appearance: Normal appearance. He is well-developed. He is not diaphoretic. HENT:      Head: Normocephalic and atraumatic. Neck:      Thyroid: No thyromegaly. Cardiovascular:      Rate and Rhythm: Normal rate and regular rhythm. Heart sounds: Normal heart sounds. Pulmonary:      Effort: Pulmonary effort is normal. No respiratory distress. Breath sounds: Normal breath sounds. No wheezing. Abdominal:      General: Abdomen is flat. Bowel sounds are normal.      Palpations: Abdomen is soft. Tenderness: There is no abdominal tenderness. Musculoskeletal:      Cervical back: Normal range of motion and neck supple. Lymphadenopathy:      Cervical: No cervical adenopathy. Skin:     General: Skin is warm and dry. Findings: No rash. Neurological:      General: No focal deficit present. Mental Status: He is alert and oriented to person, place, and time. Mental status is at baseline. Psychiatric:         Mood and Affect: Mood normal.         Behavior: Behavior normal.         Thought Content: Thought content normal.         Judgment: Judgment normal.        /76 (Site: Left Upper Arm, Position: Sitting, Cuff Size: Medium Adult)   Pulse 65   Temp 97.1 °F (36.2 °C) (Temporal)   Resp 16   Ht 5' 9\" (1.753 m)   Wt 140 lb 8 oz (63.7 kg)   SpO2 99%   BMI 20.75 kg/m²      ASSESSMENT/PLAN:    Juliana Denis was seen today for new patient. Diagnoses and all orders for this visit:    Wellness examination  -     CBC Auto Differential  -     Comprehensive Metabolic Panel  -     TSH without Reflex    Need for influenza vaccination  -     INFLUENZA, MDCK QUADV, 2 YRS AND OLDER, IM, PF, PREFILL SYR OR SDV, 0.5ML (FLUCELVAX QUADV, PF)  -     CBC Auto Differential  -     Comprehensive Metabolic Panel  -     TSH without Reflex    Leukopenia, unspecified type  -     CBC Auto Differential    Anxiety and depression  -     CBC Auto Differential  -     Comprehensive Metabolic Panel  -     TSH without Reflex        Labs were drawn today. We will notify him of the results when we receive them back.   Flu shot was given in office. Follow-up with us in 1 year for a physical unless needed sooner. EMR Dragon/transcription disclaimer:  Much of this encounter note is electronic transcription/translation of spoken language toprinted texts. The electronic translation of spoken language may be erroneous, or at times, nonsensical words or phrases may be inadvertently transcribed.   Although I have reviewed the note for such errors, some may stillexist.

## 2022-01-26 ENCOUNTER — OFFICE VISIT (OUTPATIENT)
Dept: PRIMARY CARE CLINIC | Age: 32
End: 2022-01-26
Payer: MEDICARE

## 2022-01-26 VITALS
WEIGHT: 145.4 LBS | BODY MASS INDEX: 21.53 KG/M2 | SYSTOLIC BLOOD PRESSURE: 110 MMHG | RESPIRATION RATE: 16 BRPM | HEIGHT: 69 IN | OXYGEN SATURATION: 99 % | HEART RATE: 78 BPM | DIASTOLIC BLOOD PRESSURE: 70 MMHG | TEMPERATURE: 98.4 F

## 2022-01-26 DIAGNOSIS — R05.9 COUGH: Primary | ICD-10-CM

## 2022-01-26 DIAGNOSIS — R52 BODY ACHES: ICD-10-CM

## 2022-01-26 DIAGNOSIS — J06.9 URI, ACUTE: ICD-10-CM

## 2022-01-26 LAB — SARS-COV-2, PCR: DETECTED

## 2022-01-26 PROCEDURE — 99213 OFFICE O/P EST LOW 20 MIN: CPT | Performed by: FAMILY MEDICINE

## 2022-01-26 ASSESSMENT — ENCOUNTER SYMPTOMS
RHINORRHEA: 1
CONSTIPATION: 0
DIARRHEA: 0
COUGH: 1
SORE THROAT: 1
NAUSEA: 0
COLOR CHANGE: 0
VOMITING: 0
ABDOMINAL PAIN: 0

## 2022-01-26 NOTE — PATIENT INSTRUCTIONS
ZINC 50 mg daily  VITAMIN C 1000 mg daily  VITAMIN D 2000 units daily    MUCINEX and FLONASE for congestion

## 2022-01-26 NOTE — PROGRESS NOTES
SUBJECTIVE:    Patient ID: Robin Smith is a 27 y.o. male. HPI:   Patient is seen today for complaints of cough congestion body aches, headache, and sore throat. He states that his symptoms started last week. He states that he is feeling slightly better. He states that wife was positive as well as mother. He states that he has not been vaccinated. He states that he is drinking plenty of fluids. He states he has not had any severe shortness of breath. He denies any vomiting or diarrhea. Past Medical History:   Diagnosis Date    Anxiety     Depression       No current outpatient medications on file prior to visit. No current facility-administered medications on file prior to visit. Allergies   Allergen Reactions    Amoxicillin-Pot Clavulanate        Review of Systems   Constitutional: Positive for fatigue. Negative for activity change and appetite change. HENT: Positive for congestion, postnasal drip, rhinorrhea and sore throat. Eyes: Negative for visual disturbance. Respiratory: Positive for cough. Cardiovascular: Negative for chest pain and palpitations. Gastrointestinal: Negative for abdominal pain, constipation, diarrhea, nausea and vomiting. Genitourinary: Negative for decreased urine volume and difficulty urinating. Musculoskeletal: Positive for myalgias. Negative for arthralgias. Skin: Negative for color change and rash. Allergic/Immunologic: Negative for immunocompromised state. Neurological: Positive for headaches. Negative for seizures. Hematological: Does not bruise/bleed easily. Psychiatric/Behavioral: Negative for agitation and sleep disturbance. OBJECTIVE:    Physical Exam  Constitutional:       General: He is not in acute distress. Appearance: He is well-developed. He is not diaphoretic. HENT:      Head: Normocephalic and atraumatic.       Right Ear: Tympanic membrane, ear canal and external ear normal.      Left Ear: Tympanic membrane, ear canal and external ear normal.      Nose: Congestion and rhinorrhea present. Mouth/Throat:      Mouth: Mucous membranes are moist.   Neck:      Thyroid: No thyromegaly. Cardiovascular:      Rate and Rhythm: Normal rate and regular rhythm. Heart sounds: Normal heart sounds. Pulmonary:      Effort: Pulmonary effort is normal. No respiratory distress. Breath sounds: Normal breath sounds. No wheezing. Abdominal:      General: Bowel sounds are normal.      Palpations: Abdomen is soft. Tenderness: There is no abdominal tenderness. Musculoskeletal:      Cervical back: Normal range of motion and neck supple. Lymphadenopathy:      Cervical: No cervical adenopathy. Skin:     General: Skin is warm and dry. Findings: No rash. Neurological:      Mental Status: He is alert and oriented to person, place, and time. Psychiatric:         Behavior: Behavior normal.         Thought Content: Thought content normal.         Judgment: Judgment normal.        /70   Pulse 78   Temp 98.4 °F (36.9 °C) (Temporal)   Resp 16   Ht 5' 9\" (1.753 m)   Wt 145 lb 6.4 oz (66 kg)   SpO2 99%   BMI 21.47 kg/m²      ASSESSMENT/PLAN:    1. Cough  -     COVID-19  2. Body aches  -     COVID-19  3. URI, acute       We have obtained a Covid swab today due to wife and mom being positive. Of encouraged him to do fluids, Tylenol, ibuprofen and rest.  Of encouraged him to start taking Mucinex and Flonase as needed for congestion. I have encouraged him to be up and moving around as much as possible. If his symptoms are worsening he is to let us know. PDMP Monitoring:    Last PDMP Charlene lundberg Reviewed [facility]):  Review User Review Instant Review Result          Last Controlled Substance Monitoring Documentation      Office Visit from 12/1/2016 in [address] [place] [address] The Prescription Monitoring Report for this patient was reviewed today.  filed at 12/01/2016 1743   [facility] Controlled Substance Monitoring Possible medication side effects, risk of tolerance and/or dependence, and alternative treatments discussed filed at 12/01/2016 4394        Urine Drug Screenings (1 yr)    No resulted procedures found. Medication Contract and Consent for Opioid Use Documents Filed     Patient Documents     Type of Document Status Date Received Received By Description    Medication Contract Signed 10/24/2013  3:17 PM NELDA Celaya     Medication Contract Signed 1/13/2015  2:06 PM FÉLIX PALMER Benzo/Stimulant agreement    Medication Contract Signed 4/7/2016  3:08 PM Sandra ARANGO benzo&stimulant [address]    Medication Contract Signed 1/9/2019  2:02 PM Perez Flanagan                  EMR Dragon/transcription disclaimer:  Much of this encounter note is electronic transcription/translation of spoken language toprinted texts. The electronic translation of spoken language may be erroneous, or at times, nonsensical words or phrases may be inadvertently transcribed.   Although I have reviewed the note for such errors, some may stillexist.

## 2022-08-30 ENCOUNTER — OFFICE VISIT (OUTPATIENT)
Dept: PRIMARY CARE CLINIC | Age: 32
End: 2022-08-30
Payer: MEDICARE

## 2022-08-30 VITALS
HEIGHT: 69 IN | SYSTOLIC BLOOD PRESSURE: 110 MMHG | OXYGEN SATURATION: 97 % | DIASTOLIC BLOOD PRESSURE: 70 MMHG | BODY MASS INDEX: 20.73 KG/M2 | WEIGHT: 140 LBS | HEART RATE: 56 BPM | TEMPERATURE: 97.1 F

## 2022-08-30 DIAGNOSIS — G47.00 INSOMNIA, UNSPECIFIED TYPE: Primary | ICD-10-CM

## 2022-08-30 PROCEDURE — 99212 OFFICE O/P EST SF 10 MIN: CPT | Performed by: FAMILY MEDICINE

## 2022-08-30 SDOH — ECONOMIC STABILITY: FOOD INSECURITY: WITHIN THE PAST 12 MONTHS, THE FOOD YOU BOUGHT JUST DIDN'T LAST AND YOU DIDN'T HAVE MONEY TO GET MORE.: NEVER TRUE

## 2022-08-30 SDOH — ECONOMIC STABILITY: FOOD INSECURITY: WITHIN THE PAST 12 MONTHS, YOU WORRIED THAT YOUR FOOD WOULD RUN OUT BEFORE YOU GOT MONEY TO BUY MORE.: NEVER TRUE

## 2022-08-30 ASSESSMENT — PATIENT HEALTH QUESTIONNAIRE - PHQ9
3. TROUBLE FALLING OR STAYING ASLEEP: 0
10. IF YOU CHECKED OFF ANY PROBLEMS, HOW DIFFICULT HAVE THESE PROBLEMS MADE IT FOR YOU TO DO YOUR WORK, TAKE CARE OF THINGS AT HOME, OR GET ALONG WITH OTHER PEOPLE: 0
SUM OF ALL RESPONSES TO PHQ QUESTIONS 1-9: 0
5. POOR APPETITE OR OVEREATING: 0
SUM OF ALL RESPONSES TO PHQ9 QUESTIONS 1 & 2: 0
6. FEELING BAD ABOUT YOURSELF - OR THAT YOU ARE A FAILURE OR HAVE LET YOURSELF OR YOUR FAMILY DOWN: 0
2. FEELING DOWN, DEPRESSED OR HOPELESS: 0
4. FEELING TIRED OR HAVING LITTLE ENERGY: 0
SUM OF ALL RESPONSES TO PHQ QUESTIONS 1-9: 0
8. MOVING OR SPEAKING SO SLOWLY THAT OTHER PEOPLE COULD HAVE NOTICED. OR THE OPPOSITE, BEING SO FIGETY OR RESTLESS THAT YOU HAVE BEEN MOVING AROUND A LOT MORE THAN USUAL: 0
1. LITTLE INTEREST OR PLEASURE IN DOING THINGS: 0
9. THOUGHTS THAT YOU WOULD BE BETTER OFF DEAD, OR OF HURTING YOURSELF: 0
7. TROUBLE CONCENTRATING ON THINGS, SUCH AS READING THE NEWSPAPER OR WATCHING TELEVISION: 0
SUM OF ALL RESPONSES TO PHQ QUESTIONS 1-9: 0
SUM OF ALL RESPONSES TO PHQ QUESTIONS 1-9: 0

## 2022-08-30 ASSESSMENT — ENCOUNTER SYMPTOMS
DIARRHEA: 0
CONSTIPATION: 0
RHINORRHEA: 0
COLOR CHANGE: 0
NAUSEA: 0
ABDOMINAL PAIN: 0
VOMITING: 0
COUGH: 0

## 2022-08-30 ASSESSMENT — SOCIAL DETERMINANTS OF HEALTH (SDOH): HOW HARD IS IT FOR YOU TO PAY FOR THE VERY BASICS LIKE FOOD, HOUSING, MEDICAL CARE, AND HEATING?: NOT HARD AT ALL

## 2022-08-30 NOTE — PROGRESS NOTES
SUBJECTIVE:    Patient ID: Be Steiner is a 27 y.o. male. HPI:   Patient is seen today for a checkup and physical for foster care. He is not currently taking any medications at this time. He states that he does occasionally struggle with insomnia but does not want anything at this time. Past Medical History:   Diagnosis Date    Anxiety     Depression       No current outpatient medications on file prior to visit. No current facility-administered medications on file prior to visit. Allergies   Allergen Reactions    Amoxicillin-Pot Clavulanate        Review of Systems   Constitutional:  Negative for activity change, appetite change and fatigue. HENT:  Negative for congestion and rhinorrhea. Eyes:  Negative for visual disturbance. Respiratory:  Negative for cough. Cardiovascular:  Negative for chest pain and palpitations. Gastrointestinal:  Negative for abdominal pain, constipation, diarrhea, nausea and vomiting. Genitourinary:  Negative for decreased urine volume and difficulty urinating. Musculoskeletal:  Negative for arthralgias. Skin:  Negative for color change and rash. Allergic/Immunologic: Negative for immunocompromised state. Neurological:  Negative for seizures and headaches. Hematological:  Does not bruise/bleed easily. Psychiatric/Behavioral:  Negative for agitation and sleep disturbance. OBJECTIVE:    Physical Exam  Constitutional:       General: He is not in acute distress. Appearance: Normal appearance. He is well-developed. He is not diaphoretic. HENT:      Head: Normocephalic and atraumatic. Neck:      Thyroid: No thyromegaly. Cardiovascular:      Rate and Rhythm: Normal rate and regular rhythm. Pulses: Normal pulses. Heart sounds: Normal heart sounds. Pulmonary:      Effort: Pulmonary effort is normal. No respiratory distress. Breath sounds: Normal breath sounds. No wheezing. Abdominal:      General: Abdomen is flat.  Bowel sounds are normal.      Palpations: Abdomen is soft. Tenderness: There is no abdominal tenderness. Musculoskeletal:      Cervical back: Normal range of motion and neck supple. Lymphadenopathy:      Cervical: No cervical adenopathy. Skin:     General: Skin is warm and dry. Findings: No rash. Neurological:      General: No focal deficit present. Mental Status: He is alert and oriented to person, place, and time. Mental status is at baseline. Psychiatric:         Mood and Affect: Mood normal.         Behavior: Behavior normal.         Thought Content: Thought content normal.         Judgment: Judgment normal.      /70   Pulse 56   Temp 97.1 °F (36.2 °C)   Ht 5' 9\" (1.753 m)   Wt 140 lb (63.5 kg)   SpO2 97%   BMI 20.67 kg/m²      ASSESSMENT/PLAN:    1. Insomnia, unspecified type     Follow-up with us in for routine physical unless needed sooner. PDMP Monitoring:    Last PDMP Quique Crews as Reviewed [facility]):  Review User Review Instant Review Result          Last Controlled Substance Monitoring Documentation      [id number] Office Visit from 12/1/2016 in [address] [place] [address] The Prescription Monitoring Report for this patient was reviewed today. filed at 12/01/2016 1451   Periodic Controlled Substance Monitoring Possible medication side effects, risk of tolerance and/or dependence, and alternative treatments discussed filed at 12/01/2016 1454          Urine Drug Screenings (1 yr)    No resulted procedures found.        Medication Contract and Consent for Opioid Use Documents Filed       Patient Documents       Type of Document Status Date Received Received By Description    Medication Contract Signed 10/24/2013  3:17 PM NELDA Garcia     Medication Contract Signed 1/13/2015  2:06 PM FÉLIX PALMER Benzo/Stimulant agreement    Medication Contract Signed 4/7/2016  3:08 PM Saud ARANGO benzo&stimulant [facility]    Medication Contract Signed 1/9/2019  2:02 PM Marya Smith [facility]                     EMR Dragon/transcription disclaimer:  Much of this encounter note is electronic transcription/translation of spoken language toprinted texts. The electronic translation of spoken language may be erroneous, or at times, nonsensical words or phrases may be inadvertently transcribed.   Although I have reviewed the note for such errors, some may stillexist.